# Patient Record
Sex: FEMALE | Race: WHITE | Employment: OTHER
[De-identification: names, ages, dates, MRNs, and addresses within clinical notes are randomized per-mention and may not be internally consistent; named-entity substitution may affect disease eponyms.]

---

## 2017-03-06 DIAGNOSIS — Z86.19 HISTORY OF SHINGLES: Primary | ICD-10-CM

## 2017-03-06 RX ORDER — VALACYCLOVIR HYDROCHLORIDE 500 MG/1
500 TABLET, FILM COATED ORAL 2 TIMES DAILY
Qty: 30 TABLET | Refills: 0 | Status: SHIPPED | OUTPATIENT
Start: 2017-03-06 | End: 2019-08-29

## 2017-03-06 NOTE — TELEPHONE ENCOUNTER
To MD remington hernandez, first time filling this med for her, new pt to MD.   See initial visit note of last year.  This is what pharmacy had been filling for pt. From previous MD.

## 2017-12-31 ENCOUNTER — HEALTH MAINTENANCE LETTER (OUTPATIENT)
Age: 52
End: 2017-12-31

## 2018-11-13 ENCOUNTER — HEALTH MAINTENANCE LETTER (OUTPATIENT)
Age: 53
End: 2018-11-13

## 2019-07-12 LAB — MAMMOGRAM: NORMAL

## 2019-08-29 ENCOUNTER — NURSE TRIAGE (OUTPATIENT)
Dept: NURSING | Facility: CLINIC | Age: 54
End: 2019-08-29

## 2019-08-29 ENCOUNTER — OFFICE VISIT (OUTPATIENT)
Dept: FAMILY MEDICINE | Facility: CLINIC | Age: 54
End: 2019-08-29
Payer: COMMERCIAL

## 2019-08-29 VITALS
SYSTOLIC BLOOD PRESSURE: 94 MMHG | DIASTOLIC BLOOD PRESSURE: 70 MMHG | TEMPERATURE: 98.5 F | OXYGEN SATURATION: 100 % | BODY MASS INDEX: 18.59 KG/M2 | RESPIRATION RATE: 16 BRPM | WEIGHT: 110 LBS | HEART RATE: 72 BPM

## 2019-08-29 DIAGNOSIS — R19.7 ACUTE DIARRHEA: Primary | ICD-10-CM

## 2019-08-29 NOTE — NURSING NOTE
54 year old  Chief Complaint   Patient presents with     Diarrhea     x 4 days     Rectal Problem     x 1 day     Fever     100 - 101 seems to be ok now       Blood pressure 94/70, pulse 72, temperature 98.5  F (36.9  C), temperature source Oral, resp. rate 16, weight 49.9 kg (110 lb), SpO2 100 %. Body mass index is 18.59 kg/m .  Patient Active Problem List   Diagnosis     Preventative health care     History of shingles       Wt Readings from Last 2 Encounters:   08/29/19 49.9 kg (110 lb)   02/11/16 47.6 kg (105 lb)     BP Readings from Last 3 Encounters:   08/29/19 94/70   02/11/16 90/59         Current Outpatient Medications   Medication     valACYclovir (VALTREX) 500 MG tablet     No current facility-administered medications for this visit.        Social History     Tobacco Use     Smoking status: Never Smoker     Smokeless tobacco: Never Used   Substance Use Topics     Alcohol use: Yes     Comment: GLASS OF WINE     Drug use: No       Health Maintenance Due   Topic Date Due     COLONOSCOPY  02/02/1977     HIV SCREENING  02/02/1982     DTAP/TDAP/TD IMMUNIZATION (1 - Tdap) 02/02/1992     LIPID  02/02/2012     MAMMO SCREENING  06/01/2016     ZOSTER IMMUNIZATION (1 of 2) 02/02/2017     PREVENTIVE CARE VISIT  02/11/2017     PAP  09/01/2018     PHQ-2  01/01/2019       No results found for: PAP      August 29, 2019 1:22 PM

## 2019-08-29 NOTE — TELEPHONE ENCOUNTER
Diarrhea with blood in it.  I connected with the clinic who is going to check with their RN and may send her to the ER anyway. I told them, before transferring, that the patient won't listen to me.  Nyla Garza RN-[facility]      Reason for Disposition    Bloody, black, or tarry bowel movements    Additional Information    Negative: Shock suspected (e.g., cold/pale/clammy skin, too weak to stand, low BP, rapid pulse)    Negative: Difficult to awaken or acting confused (e.g., disoriented, slurred speech)    Negative: Sounds like a life-threatening emergency to the triager    Negative: Vomiting also present and worse than the diarrhea    Negative: Blood in stool and without diarrhea    Negative: SEVERE abdominal pain (e.g., excruciating) and present > 1 hour     Pain at 3    Negative: SEVERE abdominal pain and age > 60    Protocols used: DIARRHEA-A-OH

## 2019-08-29 NOTE — PROGRESS NOTES
"  SUBJECTIVE:   Kendra Loyd is a 54 year old female who presents to clinic today to establish care and to discuss the following problem(s).    Of note, she has been seen here once before, >3 years ago.    # Diarrhea  - Flew home from [place] Sunday night (5 days ago)  - the next morning she and her  woke up feeling achy  - by the end of the day both had fevers  - was up all night achy everywhere, abdominal pain  - dry heaving and diarrhea started  - has continued to have fevers each day until last night  - still having frequent diarrhea, watery loose stools every hour  - poor appetite since Monday, staying hydrated but not eating solids  - started having blood in stools this morning, two out of every three times she has a BM, \"mucousy blood\", not a lot in the actual toilet bowel  - no perianal pain and no history of hemorrhoids  - has a black toilet but doesn't think she has seen any dark black stools (hasn't seen any on toilet paper)  - cramping eases off with defecation  - no particular dizziness/lightheadedness  - kids did not get sick      ROS: Denies chest pain, difficulty breathing    History reviewed. No pertinent past medical history.  Past Surgical History:   Procedure Laterality Date     ATOPIC PREG  01/01/2002     BIOPSY      NOSE     HC EXCISION SKENES GLANDS N/A 01/01/1995     HCL SQUAMOUS CELL CARCINOMA AG N/A 01/01/2004     Family History   Problem Relation Age of Onset     Coronary Artery Disease Father         Controlled     Other Cancer Father         Melanoma     Diabetes No family hx of      Hypertension No family hx of      Hyperlipidemia No family hx of      Cerebrovascular Disease No family hx of      Breast Cancer No family hx of      Colon Cancer No family hx of      Prostate Cancer No family hx of      Anxiety Disorder No family hx of      Mental Illness No family hx of      Substance Abuse No family hx of      Anesthesia Reaction No family hx of      Asthma No family hx of  "     Social History     Tobacco Use     Smoking status: Never Smoker     Smokeless tobacco: Never Used   Substance Use Topics     Alcohol use: Yes     Comment: GLASS OF WINE     Drug use: No     Social History     Social History Narrative     Not on file       No current outpatient medications on file.     No current facility-administered medications for this visit.      I have reviewed the patient's past medical, surgical, family, and social history.     OBJECTIVE:   BP 94/70 (BP Location: Left arm, Patient Position: Sitting, Cuff Size: Adult Regular)   Pulse 72   Temp 98.5  F (36.9  C) (Oral)   Resp 16   Wt 49.9 kg (110 lb)   SpO2 100%   BMI 18.59 kg/m       BP Readings from Last 6 Encounters:   08/29/19 94/70   02/11/16 90/59     Constitutional: fatigued-appearing but not in acute distress, appears stated age  Eyes: conjunctivae without erythema, sclera anicteric.   Abdomen: normal bowel sounds. Soft, non-tender.  Skin: no rashes, lesions, or wounds  Psych: affect is full and appropriate, speech is fluent and non-pressured    ASSESSMENT AND PLAN:     (R19.7) Acute diarrhea  (primary encounter diagnosis)  Comment: Likely bacterial gastroenteritis given fevers, mucoid/bloody diarrhea, lack of appetite. Given general well appearance and the fact that she is no longer febrile, testing or empiric treatment is not strictly necessary but persistent diarrhea with blood/mucous would push me to favor at least testing. Sending home with stool collection kit. If no improvement by tomorrow morning will bring in sample. Discussed and offered empiric antibiotics but will hold off for now pending testing results.   Plan: Enteric Bacteria and Virus Panel by [facility] Stool,         Clostridium difficile toxin B    Encouraged her to return once well for routine preventive care.         Xavi Oro MD   [facility]  08/29/2019, 1:42 PM

## 2019-08-30 ENCOUNTER — TELEPHONE (OUTPATIENT)
Dept: FAMILY MEDICINE | Facility: CLINIC | Age: 54
End: 2019-08-30

## 2019-08-30 NOTE — TELEPHONE ENCOUNTER
[facility] Call Center    Phone Message    May a detailed message be left on voicemail: yes    Reason for Call: Symptoms or Concerns       Current symptom or concern: Kendra was seen in clinic yesterday by Dr. Oro.  She believes her symptoms may be related to the EEE Virus and would like to be tested.  She also indicates that her , also a [facility] patient, is presenting with similar symptoms.  Her spouse has not recently been seen.    Symptoms have been present for: 4 days(s)    Has patient previously been seen for this? Yes    By : Dr. Oro    Date: 8.29.19    Are there any new or worsening symptoms? No      Action Taken: Message routed to:  [facility] nurse

## 2019-08-30 NOTE — TELEPHONE ENCOUNTER
Called Kendra.    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  The outbreaks, though, involve typically <10 cases over 2-3 years at a time and there have been fewer than 100 cases in [place] since 1938.  I would also expect Yusuf to be more ill if she had EEE.  Furthermore, EEE is typically managed with supportive care only and given that her symptoms seem to be improving today (no longer febrile, diarrhea improved), I would not expect to manage her differently even if she tested positive for EEE (though of course it would be a reportable illness).     Discussed bringing in a stool sample as planned at the visit yesterday, at this point mostly for the reassurance that a definitive answer would provide. Because she is improving, Yusuf declined.    I have asked her to call back with any new or concerning symptoms or if she does not continue to improve.     Telly is schedule to see me at 2pm today with similar symptoms.    Xavi Oro MD on 8/30/2019 at 12:57 PM

## 2019-09-20 ENCOUNTER — DOCUMENTATION ONLY (OUTPATIENT)
Dept: CARE COORDINATION | Facility: CLINIC | Age: 54
End: 2019-09-20

## 2019-09-20 NOTE — TELEPHONE ENCOUNTER
RECORDS RECEIVED FROM: Lt knee injury due to fall, per pt. DOI 08/2019. No records available   DATE RECEIVED: 9/20   NOTES STATUS DETAILS   OFFICE NOTE from referring provider N/A    OFFICE NOTE from other specialist N/A    DISCHARGE SUMMARY from hospital N/A    DISCHARGE REPORT from the ER N/A    OPERATIVE REPORT N/A    MEDICATION LIST N/A    MRI N/A    CT SCAN N/A    XRAYS (IMAGES & REPORTS) N/A

## 2019-09-23 ENCOUNTER — ANCILLARY PROCEDURE (OUTPATIENT)
Dept: GENERAL RADIOLOGY | Facility: CLINIC | Age: 54
End: 2019-09-23
Payer: COMMERCIAL

## 2019-09-23 ENCOUNTER — OFFICE VISIT (OUTPATIENT)
Dept: ORTHOPEDICS | Facility: CLINIC | Age: 54
End: 2019-09-23
Payer: COMMERCIAL

## 2019-09-23 ENCOUNTER — PRE VISIT (OUTPATIENT)
Dept: ORTHOPEDICS | Facility: CLINIC | Age: 54
End: 2019-09-23

## 2019-09-23 VITALS — HEIGHT: 64 IN | WEIGHT: 110 LBS | BODY MASS INDEX: 18.78 KG/M2

## 2019-09-23 DIAGNOSIS — M25.562 LEFT KNEE PAIN, UNSPECIFIED CHRONICITY: Primary | ICD-10-CM

## 2019-09-23 DIAGNOSIS — M22.2X2 PATELLOFEMORAL PAIN SYNDROME OF LEFT KNEE: Primary | ICD-10-CM

## 2019-09-23 DIAGNOSIS — M25.562 LEFT KNEE PAIN, UNSPECIFIED CHRONICITY: ICD-10-CM

## 2019-09-23 ASSESSMENT — PAIN SCALES - GENERAL: PAINLEVEL: NO PAIN (1)

## 2019-09-23 ASSESSMENT — MIFFLIN-ST. JEOR: SCORE: 1090.47

## 2019-09-23 NOTE — LETTER
"  9/23/2019      RE: Kendra Loyd  [address] [address]       Sports Medicine Clinic Visit    PCP: Kam Malone    Kendra Loyd is a 54 year old female who is seen  as self referral presenting with left knee pain.    Injury: 8/18/2019- fell and left knee landed directly onto a rock while hiking    Location of Pain: left anterior knee  Duration of Pain: 1 month(s)  Rating of Pain: 1/10 dull and achy at rest; 5/10 with exercise  Pain is better with: Rest  Pain is worse with: Jogging, running, exercising, walking (initially)  Additional Features: Bruising  Treatment so far consists of: Rest  Prior History of related problems: None    Ht 1.62 m (5' 3.78\")   Wt 49.9 kg (110 lb)   BMI 19.01 kg/m            PMH:  Cyst removed in her mid 20s; squamous cell carcinoma removed with skin graft on the right side of her nose; one ectopic pregnancy; recurrent zoster    Active problem list:  Patient Active Problem List   Diagnosis     Preventative health care     History of shingles       FH:  Family History   Problem Relation Age of Onset     Coronary Artery Disease Father         Controlled     Other Cancer Father         Melanoma     Diabetes No family hx of      Hypertension No family hx of      Hyperlipidemia No family hx of      Cerebrovascular Disease No family hx of      Breast Cancer No family hx of      Colon Cancer No family hx of      Prostate Cancer No family hx of      Anxiety Disorder No family hx of      Mental Illness No family hx of      Substance Abuse No family hx of      Anesthesia Reaction No family hx of      Asthma No family hx of        SH:  Social History     Socioeconomic History     Marital status:      Spouse name: Not on file     Number of children: Not on file     Years of education: Not on file     Highest education level: Not on file   Occupational History     Not on file   Social Needs     Financial resource strain: Not on file     Food insecurity:     Worry: " Not on file     Inability: Not on file     Transportation needs:     Medical: Not on file     Non-medical: Not on file   Tobacco Use     Smoking status: Never Smoker     Smokeless tobacco: Never Used   Substance and Sexual Activity     Alcohol use: Yes     Comment: GLASS OF WINE     Drug use: No     Sexual activity: Yes     Partners: Male   Lifestyle     Physical activity:     Days per week: Not on file     Minutes per session: Not on file     Stress: Not on file   Relationships     Social connections:     Talks on phone: Not on file     Gets together: Not on file     Attends Islam service: Not on file     Active member of club or organization: Not on file     Attends meetings of clubs or organizations: Not on file     Relationship status: Not on file     Intimate partner violence:     Fear of current or ex partner: Not on file     Emotionally abused: Not on file     Physically abused: Not on file     Forced sexual activity: Not on file   Other Topics Concern     Parent/sibling w/ CABG, MI or angioplasty before 65F 55M? Not Asked   Social History Narrative     Not on file       MEDS:  See EMR, reviewed  ALL:  See EMR, reviewed    REVIEW OF SYSTEMS:  CONSTITUTIONAL:NEGATIVE for fever, chills, change in weight  INTEGUMENTARY/SKIN: NEGATIVE for worrisome rashes, moles or lesions  EYES: NEGATIVE for vision changes or irritation  ENT/MOUTH: NEGATIVE for ear, mouth and throat problems  RESP:NEGATIVE for significant cough or SOB  BREAST: NEGATIVE for masses, tenderness or discharge  CV: NEGATIVE for chest pain, palpitations or peripheral edema  GI: NEGATIVE for nausea, abdominal pain, heartburn, or change in bowel habits  :NEGATIVE for frequency, dysuria, or hematuria  :NEGATIVE for frequency, dysuria, or hematuria  NEURO: NEGATIVE for weakness, dizziness or paresthesias  ENDOCRINE: NEGATIVE for temperature intolerance, skin/hair changes  HEME/ALLERGY/IMMUNE: NEGATIVE for bleeding problems  PSYCHIATRIC: NEGATIVE  for changes in mood or affect        Subjective: This 54-year-old female landed directly onto her anterior knee 4 weeks ago when she lost her balance hiking.  It was bruised initially.  It does not bother her a month later with any of the usual daily activities but certain positions of kneeling on the knee can be uncomfortable and she can notice some pain at the anterior knee after exercise.  Otherwise it does not seem to bother her with any specific movement.  She denies a sense of instability.  It does not swell or lock.    Objective: There is a nearly resolved bruise along the kneecap.  There is no effusion.  She can do an active straight leg raise with no extension lag.  She is nontender over the medial lateral patellar facet.  Nontender over the patellar tendon or pes anserine bursa.  Patellar compression is without discomfort.  Patellar apprehension signs are negative.  Nontender over the medial lateral joint line.  I can flex and extend the knee fully.  Lachman's has a firm endpoint.  Anterior posterior drawer is negative.  MCL and LCL stresses are without discomfort or laxity.  Normal range of motion at the hip.  Distal pulses and sensation are intact.  Appropriate in conversation and affect.    An x-ray of the knee shows no bony abnormality or degenerative change    Assessment left-sided traumatic patellofemoral discomfort    Plan: She would like to see physical therapy.  It would be reasonable to try some patellar taping and alignment program since is been 4 weeks since her initial injury.  She will follow-up if not improved.    This note was created with the use of Dragon software and unintentional spelling or errors may have occurred.        Hernesto Dodson MD

## 2019-09-23 NOTE — PROGRESS NOTES
"Sports Medicine Clinic Visit    PCP: Kam Malone    Kendra Loyd is a 54 year old female who is seen  as self referral presenting with left knee pain.    Injury: 8/18/2019- fell and left knee landed directly onto a rock while hiking    Location of Pain: left anterior knee  Duration of Pain: 1 month(s)  Rating of Pain: 1/10 dull and achy at rest; 5/10 with exercise  Pain is better with: Rest  Pain is worse with: Jogging, running, exercising, walking (initially)  Additional Features: Bruising  Treatment so far consists of: Rest  Prior History of related problems: None    Ht 1.62 m (5' 3.78\")   Wt 49.9 kg (110 lb)   BMI 19.01 kg/m           PMH:  Cyst removed in her mid 20s; squamous cell carcinoma removed with skin graft on the right side of her nose; one ectopic pregnancy; recurrent zoster    Active problem list:  Patient Active Problem List   Diagnosis     Preventative health care     History of shingles       FH:  Family History   Problem Relation Age of Onset     Coronary Artery Disease Father         Controlled     Other Cancer Father         Melanoma     Diabetes No family hx of      Hypertension No family hx of      Hyperlipidemia No family hx of      Cerebrovascular Disease No family hx of      Breast Cancer No family hx of      Colon Cancer No family hx of      Prostate Cancer No family hx of      Anxiety Disorder No family hx of      Mental Illness No family hx of      Substance Abuse No family hx of      Anesthesia Reaction No family hx of      Asthma No family hx of        SH:  Social History     Socioeconomic History     Marital status:      Spouse name: Not on file     Number of children: Not on file     Years of education: Not on file     Highest education level: Not on file   Occupational History     Not on file   Social Needs     Financial resource strain: Not on file     Food insecurity:     Worry: Not on file     Inability: Not on file     Transportation needs:     Medical: Not " on file     Non-medical: Not on file   Tobacco Use     Smoking status: Never Smoker     Smokeless tobacco: Never Used   Substance and Sexual Activity     Alcohol use: Yes     Comment: GLASS OF WINE     Drug use: No     Sexual activity: Yes     Partners: Male   Lifestyle     Physical activity:     Days per week: Not on file     Minutes per session: Not on file     Stress: Not on file   Relationships     Social connections:     Talks on phone: Not on file     Gets together: Not on file     Attends Roman Catholic service: Not on file     Active member of club or organization: Not on file     Attends meetings of clubs or organizations: Not on file     Relationship status: Not on file     Intimate partner violence:     Fear of current or ex partner: Not on file     Emotionally abused: Not on file     Physically abused: Not on file     Forced sexual activity: Not on file   Other Topics Concern     Parent/sibling w/ CABG, MI or angioplasty before 65F 55M? Not Asked   Social History Narrative     Not on file       MEDS:  See EMR, reviewed  ALL:  See EMR, reviewed    REVIEW OF SYSTEMS:  CONSTITUTIONAL:NEGATIVE for fever, chills, change in weight  INTEGUMENTARY/SKIN: NEGATIVE for worrisome rashes, moles or lesions  EYES: NEGATIVE for vision changes or irritation  ENT/MOUTH: NEGATIVE for ear, mouth and throat problems  RESP:NEGATIVE for significant cough or SOB  BREAST: NEGATIVE for masses, tenderness or discharge  CV: NEGATIVE for chest pain, palpitations or peripheral edema  GI: NEGATIVE for nausea, abdominal pain, heartburn, or change in bowel habits  :NEGATIVE for frequency, dysuria, or hematuria  :NEGATIVE for frequency, dysuria, or hematuria  NEURO: NEGATIVE for weakness, dizziness or paresthesias  ENDOCRINE: NEGATIVE for temperature intolerance, skin/hair changes  HEME/ALLERGY/IMMUNE: NEGATIVE for bleeding problems  PSYCHIATRIC: NEGATIVE for changes in mood or affect        Subjective: This 54-year-old female landed  directly onto her anterior knee 4 weeks ago when she lost her balance hiking.  It was bruised initially.  It does not bother her a month later with any of the usual daily activities but certain positions of kneeling on the knee can be uncomfortable and she can notice some pain at the anterior knee after exercise.  Otherwise it does not seem to bother her with any specific movement.  She denies a sense of instability.  It does not swell or lock.    Objective: There is a nearly resolved bruise along the kneecap.  There is no effusion.  She can do an active straight leg raise with no extension lag.  She is nontender over the medial lateral patellar facet.  Nontender over the patellar tendon or pes anserine bursa.  Patellar compression is without discomfort.  Patellar apprehension signs are negative.  Nontender over the medial lateral joint line.  I can flex and extend the knee fully.  Lachman's has a firm endpoint.  Anterior posterior drawer is negative.  MCL and LCL stresses are without discomfort or laxity.  Normal range of motion at the hip.  Distal pulses and sensation are intact.  Appropriate in conversation and affect.    An x-ray of the knee shows no bony abnormality or degenerative change    Assessment left-sided traumatic patellofemoral discomfort    Plan: She would like to see physical therapy.  It would be reasonable to try some patellar taping and alignment program since is been 4 weeks since her initial injury.  She will follow-up if not improved.    This note was created with the use of Dragon software and unintentional spelling or errors may have occurred.

## 2019-09-30 LAB
HPV ABSTRACT: NORMAL
PAP-ABSTRACT: NORMAL

## 2019-10-10 ENCOUNTER — TELEPHONE (OUTPATIENT)
Dept: FAMILY MEDICINE | Facility: CLINIC | Age: 54
End: 2019-10-10

## 2019-10-10 DIAGNOSIS — Z12.11 SPECIAL SCREENING FOR MALIGNANT NEOPLASMS, COLON: Primary | ICD-10-CM

## 2019-10-10 NOTE — TELEPHONE ENCOUNTER
[facility] Call Center    Phone Message    May a detailed message be left on voicemail: yes    Reason for Call: Other: Patient calling requesting an order be placed for her to have a routine colooscopy, jennifer call to confirm thank you.      Action Taken: Message routed to:  [facility] [facility]: [place]

## 2019-12-09 ENCOUNTER — TELEPHONE (OUTPATIENT)
Dept: GASTROENTEROLOGY | Facility: OUTPATIENT CENTER | Age: 54
End: 2019-12-09

## 2019-12-09 NOTE — TELEPHONE ENCOUNTER
Patient taking any blood thinners ? No     Heart disease ? Denies     Lung disease ?Denies       Sleep apnea ?Denies     Diabetic ?Denies     Kidney disease ?Denies     Electronic implanted medical devices ?Denies     Are you taking any narcotic pain medication ? No       PTSD ? N/a     Prep instructions reviewed with patient ? Instructions, policy,MAC sedation plan reviewed. Advised patient to have someone stay with them post exam.    Pharmacy : N/a    Indication for procedure : Screening colonoscopy    Referring provider :Kam Malone MD     Arrival Time : 1 PM     :

## 2019-12-16 ENCOUNTER — DOCUMENTATION ONLY (OUTPATIENT)
Dept: GASTROENTEROLOGY | Facility: OUTPATIENT CENTER | Age: 54
End: 2019-12-16
Payer: COMMERCIAL

## 2019-12-16 ENCOUNTER — TRANSFERRED RECORDS (OUTPATIENT)
Dept: HEALTH INFORMATION MANAGEMENT | Facility: CLINIC | Age: 54
End: 2019-12-16

## 2020-01-13 ENCOUNTER — OFFICE VISIT (OUTPATIENT)
Dept: FAMILY MEDICINE | Facility: CLINIC | Age: 55
End: 2020-01-13
Payer: COMMERCIAL

## 2020-01-13 VITALS
HEART RATE: 56 BPM | SYSTOLIC BLOOD PRESSURE: 97 MMHG | OXYGEN SATURATION: 100 % | HEIGHT: 64 IN | DIASTOLIC BLOOD PRESSURE: 63 MMHG | BODY MASS INDEX: 18.52 KG/M2 | RESPIRATION RATE: 16 BRPM | WEIGHT: 108.5 LBS | TEMPERATURE: 97.9 F

## 2020-01-13 DIAGNOSIS — Z00.00 PREVENTATIVE HEALTH CARE: Primary | ICD-10-CM

## 2020-01-13 SDOH — HEALTH STABILITY: MENTAL HEALTH: HOW OFTEN DO YOU HAVE A DRINK CONTAINING ALCOHOL?: 4 OR MORE TIMES A WEEK

## 2020-01-13 ASSESSMENT — MIFFLIN-ST. JEOR: SCORE: 1087.15

## 2020-01-13 NOTE — PROGRESS NOTES
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 Overall she is doing well. She has two children. Her daughter is a freshman at [employer] at bedtime and her son is a sophomore at [employer]. Her son has been battling with mental health issues which has caused some stress.     She would like to discuss the Shingrix vaccine today. She has had recurrent episodes of a shingles like phenomena in the past. She literally could not walk at one point and was hospitalized for several days. She is concerned that she will have another recurrent episode.     FAMILY, SOCIAL AND PAST SURGICAL HISTORIES: History of campylobacter infection this past summer that has now resolved.       MEDICATIONS:  Carefully reviewed.       HEALTHCARE MAINTENANCE: She does not wear glasses or contacts. She has no problems reading her music when playing, but she has noticed her vision has worsened. He has some mild concerns regarding her hearing. Finger rub heard from 2 ft away bilaterally. Dental care is up-to-date. Her blood pressure is 97/63 today. Body mass index is 18.62 kg/m . From an immunization standpoint, she is up to date. Mammogram and Pap is up to date. She had a colonoscopy on 12/16/19 that showed some diverticulosis, otherwise it was completely normal. Necessary labs were already completed and were normal.       REVIEW OF SYSTEMS:  A 10-point review is negative.       OBJECTIVE:    GENERAL: Yusuf is in no distress.  Affect is upbeat.     VITAL SIGNS: BP 97/63 (BP Location: Right arm, Patient Position: Sitting, Cuff Size: Adult Regular)   Pulse 56   Temp 97.9  F (36.6  C) (Oral)   Resp 16   Ht 1.626 m (5' 4\")   Wt 49.2 kg (108 lb 8 oz)   SpO2 100%   BMI 18.62 kg/m    HEENT:  Head is normocephalic, atraumatic. Ears clear bilaterally. No pain with palpation of the frontal or maxillary " sinuses.  Eyes are grossly normal.  Nose is free of any congestion or discharge.  Teeth in good repair.  Mucous membranes are moist.  Throat looks benign.  Neck is supple without adenopathy or thyromegaly.  No carotid bruits are heard, no JVD.   BACK:  Smooth and straight.  No pain to percussion.  No CVA tenderness.   LUNGS:  Clear to auscultation bilaterally.   HEART:  Regular rate and rhythm without murmur.     EXTREMITIES:  Ankles free of any edema.   SKIN:  Warm and dry.       LABORATORY DATA: none today      ASSESSMENT AND PLAN:   1. Adult physical exam, up to date on healthcare maintenance strategies.   2. Recommended getting an eye exam.  3. Discussed recommendation for shingles vaccine when available at pharmacy.  4. Return to clinic in 1 year for annual physical    Call with any problems or questions.     I, Sean Sharp, am serving as a scribe to document services personally performed by Dr. Kam Malone, based on data collection and the provider's statements to me. Dr. Malone has reviewed, edited, and approved the above note.     --Kam Malone MD

## 2020-01-13 NOTE — NURSING NOTE
"54 year old  Chief Complaint   Patient presents with     Physical     Imm/Inj     talk about whether to get Shingrix        Blood pressure 97/63, pulse 56, temperature 97.9  F (36.6  C), temperature source Oral, resp. rate 16, height 1.626 m (5' 4\"), weight 49.2 kg (108 lb 8 oz), SpO2 100 %. Body mass index is 18.62 kg/m .  Patient Active Problem List   Diagnosis     Preventative health care     History of shingles       Wt Readings from Last 2 Encounters:   01/13/20 49.2 kg (108 lb 8 oz)   09/23/19 49.9 kg (110 lb)     BP Readings from Last 3 Encounters:   01/13/20 97/63   08/29/19 94/70   02/11/16 90/59         No current outpatient medications on file.     No current facility-administered medications for this visit.        Social History     Tobacco Use     Smoking status: Never Smoker     Smokeless tobacco: Never Used   Substance Use Topics     Alcohol use: Yes     Frequency: 4 or more times a week     Comment: GLASS OF WINE     Drug use: No       Health Maintenance Due   Topic Date Due     HIV SCREENING  02/12/1982     HPV  02/12/1988     LIPID  02/12/2012     MAMMO SCREENING  06/01/2016     PREVENTIVE CARE VISIT  02/11/2017     ZOSTER IMMUNIZATION (1 of 2) 02/12/2017     INFLUENZA VACCINE (1) 09/01/2019     PHQ-2  01/01/2020       No results found for: PAP      January 13, 2020 8:59 AM    "

## 2020-07-29 ENCOUNTER — TELEPHONE (OUTPATIENT)
Dept: FAMILY MEDICINE | Facility: CLINIC | Age: 55
End: 2020-07-29

## 2020-07-29 DIAGNOSIS — Z20.822 EXPOSURE TO COVID-19 VIRUS: Primary | ICD-10-CM

## 2020-07-29 NOTE — TELEPHONE ENCOUNTER
Traveling to [place], arriving 8/16/20 7:30pm, and needs COVID-PCR test administered 72 hours prior to arrival and negative result to gain entry to [place].    She will call her children's PCP for test orders for them, they are not under [facility] care.     Sheila Lewis RN  07/29/20  12:05 PM

## 2020-07-29 NOTE — TELEPHONE ENCOUNTER
[facility] Call [facility]    Phone Message    May a detailed message be left on voicemail: yes     Reason for Call: Other: Patient called stating she is traveling to [place] in August to visit family. Patient stated that [place] requires any [place] resident to have a COVID test 72 hours prior to coming to the Critical access hospital. Patient wondering if Dr. Malone can order the test for her or where she can go to be tested. Patient also stated that she is needing to have her children tested (children are not patients of the clinic per pt). Per Ailyn at the , sending message to the clinic for review. Please call patient back.     Action Taken: Message routed to:  [facility]: [facility]    Travel Screening: Not Applicable

## 2020-08-12 ENCOUNTER — NURSE TRIAGE (OUTPATIENT)
Dept: NURSING | Facility: CLINIC | Age: 55
End: 2020-08-12

## 2020-08-12 DIAGNOSIS — U07.1 2019 NOVEL CORONAVIRUS DISEASE (COVID-19): Primary | ICD-10-CM

## 2020-08-12 NOTE — TELEPHONE ENCOUNTER
"Yusuf is calling to schedule an antibody test.    Patient is calling requesting COVID serologic antibody testing.  NOTE: Serologic testing is a blood test for 'antibodies' which are made at 10-14 days after you have had symptoms of COVID or were exposed and had an asymptomatic infection.  This does NOT test you for 'active' infection or tell you if you are contagious.    Are you a healthcare worker? no  Do you currently have a cough, fever, body aches, shortness of breath, or difficulty breathing?  No  Did you previously have cough, fever, body aches, shortness of breath, or difficulty breathing that have now resolved? Has had previous covid symptoms.   Symptoms began 150 days ago.  Symptoms started > 14 days ago. Lab order placed per SARS-CoV-2 Serology test Standing Order using indication \"Previously symptomatic >14d since onset, currently asymptomatic\" and diagnosis code \"Screening for viral disease\" (Z11.59)      The patient was informed: \"Testing is limited each day and it may take time for testing to be available to everyone who has called. You will receive a call within 48-72 hours to schedule the serology testing. Please confirm the best number to reach you is [phone]. If you have any questions about scheduling, call [phone].\"             "

## 2020-08-13 DIAGNOSIS — Z20.822 EXPOSURE TO COVID-19 VIRUS: ICD-10-CM

## 2020-08-13 DIAGNOSIS — U07.1 2019 NOVEL CORONAVIRUS DISEASE (COVID-19): ICD-10-CM

## 2020-08-13 LAB
SARS-COV-2 RNA SPEC QL NAA+PROBE: NOT DETECTED
SPECIMEN SOURCE: NORMAL

## 2020-08-13 PROCEDURE — 36415 COLL VENOUS BLD VENIPUNCTURE: CPT | Performed by: EMERGENCY MEDICINE

## 2020-08-13 PROCEDURE — 86769 SARS-COV-2 COVID-19 ANTIBODY: CPT | Performed by: EMERGENCY MEDICINE

## 2020-08-15 LAB
COVID-19 SPIKE RBD ABY TITER: NORMAL
COVID-19 SPIKE RBD ABY: NEGATIVE

## 2020-08-25 ENCOUNTER — VIRTUAL VISIT (OUTPATIENT)
Dept: FAMILY MEDICINE | Facility: OTHER | Age: 55
End: 2020-08-25
Payer: COMMERCIAL

## 2020-08-25 PROCEDURE — 99421 OL DIG E/M SVC 5-10 MIN: CPT | Performed by: PHYSICIAN ASSISTANT

## 2020-08-25 NOTE — PROGRESS NOTES
"Date: 2020 16:23:37  Clinician: Marcus Fuentes  Clinician NPI: [id number]  Patient: Kendra Loyd  Patient : 1965  Patient Address: [address], [place] [address]  Patient Phone: [phone]  Visit Protocol: URI  Patient Summary:  Kendra is a 55 year old ( : 1965 ) female who initiated a Visit for COVID-19 (Coronavirus) evaluation and screening. When asked the question \"Please sign me up to receive news, health information and promotions from [employer].\", Kendra responded \"No\".    When asked when her symptoms started, Kendra reported that she does not have any symptoms.   She denies having recent facial or sinus surgery in the past 60 days and taking antibiotic medication in the past month.    Pertinent COVID-19 (Coronavirus) information  In the past 14 days, Kendra has not worked in a congregate living setting.   She does not work or volunteer as healthcare worker or a  and does not work or volunteer in a healthcare facility.   Kendra also has not lived in a congregate living setting in the past 14 days. She does not live with a healthcare worker.   Kendra has had a close contact with a laboratory-confirmed COVID-19 patient in the last 14 days. Additional information about contact with COVID-19 (Coronavirus) patient as reported by the patient (free text): My son was recently confirmed COVID positive, but asymptomatic.    Patient reported they are living in the same household with a COVID-19 positive patient.  Since 2019, Kendra and has not had upper respiratory infection or influenza-like illness. Has not been diagnosed with lab-confirmed COVID-19 test   Pertinent medical history  Kendra does not get yeast infections when she takes antibiotics.   Kendra needs a return to work/school note.   Weight: 108 lbs   Kendra does not smoke or use smokeless tobacco.   Weight: 108 lbs    MEDICATIONS: No current medications, " "ALLERGIES: NKDA  Clinician Response:  Dear Kendra,   Your symptoms show that you may have coronavirus (COVID-19). This illness can cause fever, cough and trouble breathing. Many people get a mild case and get better on their own. Some people can get very sick.  What should I do?  We would like to test you for this virus.   1. Please call [phone] to schedule your visit. Explain that you were referred by [facility] to have a COVID-19 test. Be ready to share your [facility] visit ID number.  The following will serve as your written order for this COVID Test, ordered by me, for the indication of suspected COVID [Z20.828]: The test will be ordered in [employer], our electronic health record, after you are scheduled. It will show as ordered and authorized by Ron Pearce MD.  Order: COVID-19 (Coronavirus) PCR for SYMPTOMATIC testing from [facility].      2. When it's time for your COVID test:  Stay at least 6 feet away from others. (If someone will drive you to your test, stay in the backseat, as far away from the  as you can.)   Cover your mouth and nose with a mask, tissue or washcloth.  Go straight to the testing site. Don't make any stops on the way there or back.      3.Starting now: Stay home and away from others (self-isolate) until:   You've had no fever---and no medicine that reduces fever---for one full day (24 hours). And...   Your other symptoms have gotten better. For example, your cough or breathing has improved. And...   At least 10 days have passed since your symptoms started.       During this time, don't leave the house except for testing or medical care.   Stay in your own room, even for meals. Use your own bathroom if you can.   Stay away from others in your home. No hugging, kissing or shaking hands. No visitors.  Don't go to work, school or anywhere else.    Clean \"high touch\" surfaces often (doorknobs, counters, handles, etc.). Use a household cleaning spray or wipes. You'll find a full list of  " on the EPA website: www.epa.gov/pesticide-registration/list-n-disinfectants-use-against-sars-cov-2.   Cover your mouth and nose with a mask, tissue or washcloth to avoid spreading germs.  Wash your hands and face often. Use soap and water.  Caregivers in these groups are at risk for severe illness due to COVID-19:  o People 65 years and older  o People who live in a nursing home or long-term care facility  o People with chronic disease (lung, heart, cancer, diabetes, kidney, liver, immunologic)  o People who have a weakened immune system, including those who:   Are in cancer treatment  Take medicine that weakens the immune system, such as corticosteroids  Had a bone marrow or organ transplant  Have an immune deficiency  Have poorly controlled HIV or AIDS  Are obese (body mass index of 40 or higher)  Smoke regularly   o Caregivers should wear gloves while washing dishes, handling laundry and cleaning bedrooms and bathrooms.  o Use caution when washing and drying laundry: Don't shake dirty laundry, and use the warmest water setting that you can.  o For more tips, go to www.cdc.gov/coronavirus/2019-ncov/downloads/10Things.pdf.    4.Sign up for [employer]. We know it's scary to hear that you might have COVID-19. We want to track your symptoms to make sure you're okay over the next 2 weeks. Please look for an email from [employer]---this is a free, online program that we'll use to keep in touch. To sign up, follow the link in the email. Learn more at [email or web address]  How can I take care of myself?   Get lots of rest. Drink extra fluids (unless a doctor has told you not to).   Take Tylenol (acetaminophen) for fever or pain. If you have liver or kidney problems, ask your family doctor if it's okay to take Tylenol.   Adults can take either:    650 mg (two 325 mg pills) every 4 to 6 hours, or...   1,000 mg (two 500 mg pills) every 8 hours as needed.    Note: Don't take more than 3,000 mg in one day.  Acetaminophen is found in many medicines (both prescribed and over-the-counter medicines). Read all labels to be sure you don't take too much.   For children, check the Tylenol bottle for the right dose. The dose is based on the child's age or weight.    If you have other health problems (like cancer, heart failure, an organ transplant or severe kidney disease): Call your specialty clinic if you don't feel better in the next 2 days.       Know when to call 911. Emergency warning signs include:    Trouble breathing or shortness of breath Pain or pressure in the chest that doesn't go away Feeling confused like you haven't felt before, or not being able to wake up Bluish-colored lips or face.  Where can I get more information?    [facility] -- About COVID-19: [email or web address]   CDC -- What to Do If You're Sick: www.cdc.gov/coronavirus/2019-ncov/about/steps-when-sick.html   CDC -- Ending Home Isolation: www.cdc.gov/coronavirus/2019-ncov/hcp/disposition-in-home-patients.html   CDC -- Caring for Someone: www.cdc.gov/coronavirus/2019-ncov/if-you-are-sick/care-for-someone.html   [facility] -- Interim Guidance for Hospital Discharge to Home: www.health.[facility].[place]/diseases/coronavirus/hcp/hospdischarge.pdf   [facility] clinical trials (COVID-19 research studies): clinicalaffairs.[facility]-clinical-trials    Below are the COVID-19 hotlines at the [facility] of Health ([facility]). Interpreters are available.    For health questions: Call 349-388-3097 or 1-847.240.5716 (7 a.m. to 7 p.m.) For questions about schools and childcare: Call 568-470-0235 or 1-572.885.2666 (7 a.m. to 7 p.m.)    Diagnosis: Contact with and (suspected) exposure to other viral communicable diseases  Diagnosis ICD: Z20.828

## 2020-08-27 DIAGNOSIS — Z20.822 SUSPECTED 2019 NOVEL CORONAVIRUS INFECTION: Primary | ICD-10-CM

## 2020-08-28 LAB
SARS-COV-2 RNA SPEC QL NAA+PROBE: NOT DETECTED
SPECIMEN SOURCE: NORMAL

## 2020-12-27 ENCOUNTER — HEALTH MAINTENANCE LETTER (OUTPATIENT)
Age: 55
End: 2020-12-27

## 2021-02-28 ENCOUNTER — HEALTH MAINTENANCE LETTER (OUTPATIENT)
Age: 56
End: 2021-02-28

## 2021-05-10 ENCOUNTER — OFFICE VISIT (OUTPATIENT)
Dept: FAMILY MEDICINE | Facility: CLINIC | Age: 56
End: 2021-05-10
Payer: COMMERCIAL

## 2021-05-10 VITALS
TEMPERATURE: 96.9 F | WEIGHT: 116 LBS | SYSTOLIC BLOOD PRESSURE: 95 MMHG | RESPIRATION RATE: 15 BRPM | HEART RATE: 55 BPM | HEIGHT: 65 IN | BODY MASS INDEX: 19.33 KG/M2 | OXYGEN SATURATION: 98 % | DIASTOLIC BLOOD PRESSURE: 65 MMHG

## 2021-05-10 DIAGNOSIS — M75.02 ADHESIVE CAPSULITIS OF LEFT SHOULDER: Primary | ICD-10-CM

## 2021-05-10 LAB
HBA1C MFR BLD: 5.2 % (ref 4.1–5.7)
TSH SERPL DL<=0.005 MIU/L-ACNC: 1.61 MU/L (ref 0.4–4)

## 2021-05-10 ASSESSMENT — MIFFLIN-ST. JEOR: SCORE: 1126.43

## 2021-05-10 NOTE — PROGRESS NOTES
OVERVIEW: Kendra Loyd is a 56 year old female who presents to [facility] today for Shoulder Pain (left shoulder pain, thinks possible injury 4 months ago)        ASSESSMENT/PLAN:      1. 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 Given that she's with the [employer], will suggest [facility]   - Also, may try icy-hot pain patches for bedtime.   -Let me know if no improvement and need other pain medication or treatment options.       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  She presents today due to 4 months of LEFT shoulder pain. Began after being pulled by a dog leash.   Has pain now with sleep, clasping bra, doing hair.  It is starting to affect her ability to play the cello.    Saw a physician in [place]    Also, had an MRI at [facility] with the following result:  CONCLUSION:    1. MRI appearance of the inferior glenohumeral capsule as may be associated with adhesive capsulitis versus mild capsular sprain injury.    2. There is a moderate-sized segment of poorly defined deep fiber partial tearing involving the subscapularis tendon, just proximal to the tendon insertion.    3. Mild narrowing of the acromiohumeral interval. Minimal subacromial/subdeltoid bursal inflammation.    4. Unremarkable and intact long head biceps tendon.    5. No evidence for glenohumeral chondromalacia. No definite labral tear. There is a small glenohumeral joint effusion.    Review Of Systems:    Has otherwise been in usual state of health, e.g.   Cardiovascular: negative  Respiratory: No shortness of breath, dyspnea on exertion, cough, or hemoptysis  Gastrointestinal: negative  Genitourinary: negative    Problem list per EMR:  Patient Active Problem List   Diagnosis     Preventative health care     History of shingles       No current outpatient medications on file.       No Known  "Allergies     Social:   Originally from [place].  She is a professional cellist and her  is a professional musician and teacher at [facility] [employer] in [place], [place].       OBJECTIVE    Vitals: BP 95/65 (BP Location: Right arm, Patient Position: Sitting, Cuff Size: Adult Regular)   Pulse 55   Temp 96.9  F (36.1  C) (Skin)   Resp 15   Ht 1.65 m (5' 4.96\")   Wt 52.6 kg (116 lb)   SpO2 98%   BMI 19.33 kg/m    BMI= Body mass index is 19.33 kg/m .  Physically fit and well-appearing.  In no distress.  She stands with her left shoulder at a slightly lower height than her right shoulder but she states this is chronic due to her professional stance with the cello.  Her left shoulder range of motion is very limited with abduction to about 10 degrees internal range of motion to the back pocket and external range of motion to about 20 degrees with the arm lying at its side.  Her strength appears intact in these ranges of motion but it is difficult to assess.  There is no redness or warmth around the shoulder.  Her neck has full pain-free range of motion without any radiculopathy.  Normal distal neurovascular exam.    Cardiovascular and lung exams were normal.    SEE TOP OF NOTE FOR ASSESSMENT AND PLAN    --James Mark MD  [facility], Department of Family Medicine and Community Health  "

## 2021-05-10 NOTE — PATIENT INSTRUCTIONS
ASSESSMENT/PLAN:      1. 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 Given that she's with the [employer], will suggest [facility] [facility]   - Also, may try icy-hot pain patches for bedtime.   -Let me know if no improvement and need other pain medication or treatment options.       --James Mark MD

## 2021-05-10 NOTE — NURSING NOTE
"56 year old  Chief Complaint   Patient presents with     Shoulder Pain     left shoulder pain, thinks possible injury 4 months ago       Blood pressure 95/65, pulse 55, temperature 96.9  F (36.1  C), temperature source Skin, resp. rate 15, height 1.65 m (5' 4.96\"), weight 52.6 kg (116 lb), SpO2 98 %. Body mass index is 19.33 kg/m .  Patient Active Problem List   Diagnosis     Preventative health care     History of shingles       Wt Readings from Last 2 Encounters:   05/10/21 52.6 kg (116 lb)   01/13/20 49.2 kg (108 lb 8 oz)     BP Readings from Last 3 Encounters:   05/10/21 95/65   01/13/20 97/63   08/29/19 94/70         No current outpatient medications on file.     No current facility-administered medications for this visit.        Social History     Tobacco Use     Smoking status: Never Smoker     Smokeless tobacco: Never Used   Substance Use Topics     Alcohol use: Yes     Frequency: 4 or more times a week     Comment: GLASS OF WINE     Drug use: No       Health Maintenance Due   Topic Date Due     ADVANCE CARE PLANNING  Never done     HIV SCREENING  Never done     HEPATITIS C SCREENING  Never done     LIPID  Never done     ZOSTER IMMUNIZATION (1 of 2) Never done     MAMMO SCREENING  07/12/2020     PREVENTIVE CARE VISIT  01/13/2021       No results found for: PAP      May 10, 2021 2:41 PM    "

## 2021-10-04 ENCOUNTER — HEALTH MAINTENANCE LETTER (OUTPATIENT)
Age: 56
End: 2021-10-04

## 2022-01-23 ENCOUNTER — HEALTH MAINTENANCE LETTER (OUTPATIENT)
Age: 57
End: 2022-01-23

## 2022-03-20 ENCOUNTER — HEALTH MAINTENANCE LETTER (OUTPATIENT)
Age: 57
End: 2022-03-20

## 2022-06-08 ENCOUNTER — OFFICE VISIT (OUTPATIENT)
Dept: FAMILY MEDICINE | Facility: CLINIC | Age: 57
End: 2022-06-08
Payer: COMMERCIAL

## 2022-06-08 VITALS
DIASTOLIC BLOOD PRESSURE: 55 MMHG | SYSTOLIC BLOOD PRESSURE: 88 MMHG | RESPIRATION RATE: 12 BRPM | TEMPERATURE: 97.7 F | OXYGEN SATURATION: 99 % | HEART RATE: 63 BPM | WEIGHT: 115.25 LBS | BODY MASS INDEX: 19.2 KG/M2

## 2022-06-08 DIAGNOSIS — R21 SKIN RASH: Primary | ICD-10-CM

## 2022-06-08 PROBLEM — Z98.890 HISTORY OF LOOP ELECTRICAL EXCISION PROCEDURE (LEEP): Status: ACTIVE | Noted: 2022-06-08

## 2022-06-08 RX ORDER — MUPIROCIN 20 MG/G
OINTMENT TOPICAL 2 TIMES DAILY
Qty: 30 G | Refills: 0 | Status: SHIPPED | OUTPATIENT
Start: 2022-06-08

## 2022-06-08 NOTE — PROGRESS NOTES
CC: Rash (Around left eye x couple of months will not go away, is itchy)      Assessment:  Rash around the eyes, unclear etiology. Has been present for a few months, improves with polysporin but then recurs.    Plan:   Patient was provided with prescription of topical mupirocin to be applied to affected areas twice per day for one week. Also placed a referral to dermatology for evaluation. Could consider topical hydrocortisone twice per day for one week if mupirocin does not improve symptoms and if she is still waiting to get in with dermatology. Dosing and possible adverse effects of medication discussed. RTC prn if no improvement with above. Call sooner if any new or worsening symptoms. Also recommend an updated eye exam.       Subjective: Kendra Loyd is a 57 year old female who presents for evaluation of a rash around her left eye for a few months. Has been gradually worsening. It is pruritic. It initially started when she felt her eye itch and she scratched it and a couple days after that she noted a rash.  She tried Polysporin for awhile, it would go away when she used the Polysporin but then it would come back.   Doesn't wear contacts or glasses.  No eye redness, no eye drainage, no vision changes.  Is overdue for an eye exam, had a vision screening and was recommended to be seen for glasses.      New medications recently - none (except was given abx drops by a doctor in [place], did not help at all). No changes in soaps, shampoos, detergents, or lotions. No recent contact irritation patient is aware of. No change in diet. No prior history of rash like this. 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        Objective:  BP (!) 88/55 (BP Location: Right arm, Patient Position: Sitting, Cuff Size: Adult Regular)   Pulse 63   Temp 97.7  F (36.5  C) (Skin)   Resp 12   Wt 52.3 kg (115 lb 4 oz)   SpO2 99%   BMI 19.20 kg/m    General: Patient appears healthy, alert,  and in no distress.  Skin: erythematous small vesicles around left eye, below lower lid and on upper lid, no conjunctival erythema or drainage, no rash around the right eye, no other rash on the face.

## 2022-06-08 NOTE — PATIENT INSTRUCTIONS
Dermatology referral.   Mupirocin ointment twice daily for 1 week.   If not improving, consider hydrocortisone cream for 1 week.   Make an updated eye appointment.

## 2022-06-08 NOTE — NURSING NOTE
57 year old  Chief Complaint   Patient presents with     Rash     Around left eye x couple of months will not go away, is itchy       Blood pressure (!) 88/55, pulse 63, temperature 97.7  F (36.5  C), temperature source Skin, resp. rate 12, weight 52.3 kg (115 lb 4 oz), SpO2 99 %. Body mass index is 19.2 kg/m .  Patient Active Problem List   Diagnosis     History of shingles     History of loop electrical excision procedure (LEEP)       Wt Readings from Last 2 Encounters:   06/08/22 52.3 kg (115 lb 4 oz)   05/10/21 52.6 kg (116 lb)     BP Readings from Last 3 Encounters:   06/08/22 (!) 88/55   05/10/21 95/65   01/13/20 97/63         No current outpatient medications on file.     No current facility-administered medications for this visit.       Social History     Tobacco Use     Smoking status: Never Smoker     Smokeless tobacco: Never Used   Substance Use Topics     Alcohol use: Yes     Comment: GLASS OF WINE     Drug use: No       Health Maintenance Due   Topic Date Due     ADVANCE CARE PLANNING  Never done     HIV SCREENING  Never done     HEPATITIS C SCREENING  Never done     LIPID  Never done     MAMMO SCREENING  07/12/2020     PREVENTIVE CARE VISIT  01/13/2021     ZOSTER IMMUNIZATION (2 of 2) 11/09/2021       No results found for: PAP      June 8, 2022 12:52 PM

## 2022-09-11 ENCOUNTER — HEALTH MAINTENANCE LETTER (OUTPATIENT)
Age: 57
End: 2022-09-11

## 2022-11-13 ENCOUNTER — MYC MEDICAL ADVICE (OUTPATIENT)
Dept: FAMILY MEDICINE | Facility: CLINIC | Age: 57
End: 2022-11-13

## 2022-11-13 DIAGNOSIS — F41.8 PERFORMANCE ANXIETY: Primary | ICD-10-CM

## 2022-11-14 RX ORDER — PROPRANOLOL HYDROCHLORIDE 10 MG/1
TABLET ORAL
Qty: 20 TABLET | Refills: 0 | Status: SHIPPED | OUTPATIENT
Start: 2022-11-14

## 2022-11-14 NOTE — TELEPHONE ENCOUNTER
Pt requesting Propranolol for use during auditions.    Medication requested: propranolol (Inderal) 10 mg tablet  Last office visit: 6/8/2022  [facility] appointments: none  Medication last refilled: Historical  Last qualifying labs:   BP Readings from Last 3 Encounters:   06/08/22 (!) 88/55   05/10/21 95/65   01/13/20 97/63     Routing refill request to provider for review/approval because:  Medication is reported/historical    ZARIA Collado, RN  11/14/22, 8:38 AM

## 2023-04-30 ENCOUNTER — HEALTH MAINTENANCE LETTER (OUTPATIENT)
Age: 58
End: 2023-04-30

## 2023-08-28 ENCOUNTER — OFFICE VISIT (OUTPATIENT)
Dept: FAMILY MEDICINE | Facility: CLINIC | Age: 58
End: 2023-08-28
Payer: COMMERCIAL

## 2023-08-28 VITALS
TEMPERATURE: 98.1 F | DIASTOLIC BLOOD PRESSURE: 62 MMHG | SYSTOLIC BLOOD PRESSURE: 93 MMHG | OXYGEN SATURATION: 100 % | HEART RATE: 56 BPM

## 2023-08-28 DIAGNOSIS — F41.8 PERFORMANCE ANXIETY: Primary | ICD-10-CM

## 2023-08-28 RX ORDER — PROPRANOLOL HCL 60 MG
CAPSULE, EXTENDED RELEASE 24HR ORAL
Qty: 12 CAPSULE | Refills: 1 | Status: SHIPPED | OUTPATIENT
Start: 2023-08-28

## 2023-08-28 ASSESSMENT — PATIENT HEALTH QUESTIONNAIRE - PHQ9
SUM OF ALL RESPONSES TO PHQ QUESTIONS 1-9: 0
5. POOR APPETITE OR OVEREATING: NOT AT ALL

## 2023-08-28 ASSESSMENT — ANXIETY QUESTIONNAIRES
3. WORRYING TOO MUCH ABOUT DIFFERENT THINGS: NOT AT ALL
IF YOU CHECKED OFF ANY PROBLEMS ON THIS QUESTIONNAIRE, HOW DIFFICULT HAVE THESE PROBLEMS MADE IT FOR YOU TO DO YOUR WORK, TAKE CARE OF THINGS AT HOME, OR GET ALONG WITH OTHER PEOPLE: NOT DIFFICULT AT ALL
6. BECOMING EASILY ANNOYED OR IRRITABLE: NOT AT ALL
1. FEELING NERVOUS, ANXIOUS, OR ON EDGE: NOT AT ALL
GAD7 TOTAL SCORE: 0
2. NOT BEING ABLE TO STOP OR CONTROL WORRYING: NOT AT ALL
7. FEELING AFRAID AS IF SOMETHING AWFUL MIGHT HAPPEN: NOT AT ALL
GAD7 TOTAL SCORE: 0
5. BEING SO RESTLESS THAT IT IS HARD TO SIT STILL: NOT AT ALL

## 2023-08-28 NOTE — PROGRESS NOTES
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     Yusuf has a history of performance anxiety and has an Rx for propanolol 10 mg daily as needed 30 minutes prior to performance. However, she reports persistent palpitations and significant anxiety with a recent audition despite taking propanolol 10 mg once 60 minutes prior to the audition and again 30 minutes prior. She does note that this audition was with the evaluators behind a screen, which is more stressful for her than being able to see the evaluators. The propanolol was more helpful at a subsequent audition which had the evaluators visible, but she states that the propanolol has been much more helpful in the past. Additionally, her scheduled audition time is susceptible to change and it can be hard to time the propanolol. She also notes that the propanolol is not helpful with the mental aspect of her anxiety, only the physiological symptoms. She would like to discuss other treatment options today. She has never tried intentionally using deep breathing or mindfulness techniques.     MEDICATIONS:   Carefully Reviewed    REVIEW OF SYSTEMS:  10-point review of systems negative unless otherwise stated in the HPI.       OBJECTIVE:    EXAM:  GENERAL: She is in no distress.  Affect is upbeat.   Alert and oriented x3  BP 93/62 (BP Location: Right arm, Patient Position: Sitting, Cuff Size: Adult Regular)   Pulse 56   Temp 98.1  F (36.7  C) (Temporal)   SpO2 100%   HEENT:  Head is normocephalic, atraumatic.     LABORATORY DATA: None.       ASSESSMENT AND PLAN:   Performance anxiety: Start propanolol ER 60 mg daily as needed, only on days of auditions. This will allow for last-minute changes to audition times and help with the \"ramp up\" of nerves throughout the day of the audition. If necessary, may still take propanolol " short-acting 10 mg 30-60 minutes prior to audition. I will investigate options regarding psychotherapy and will get back to her with a name or two.       I, Sheila Garrison, am serving as a scribe to document services personally performed by Dr. Kam Malone, based on data collection and the provider's statements to me. Dr. Malone has reviewed, edited, and approved the above note.     I, Dr. Kam Malone, have interviewed and examined this patient, and I have thoroughly reviewed and edited this note and agree with its contents.

## 2023-08-28 NOTE — NURSING NOTE
58 year old    Chief Complaint   Patient presents with    Recheck Medication     Propanolol - has been ineffective lately, hoping to start a new med for the performance anxiety            Blood pressure 93/62, pulse 56, temperature 98.1  F (36.7  C), temperature source Temporal, SpO2 100 %. There is no height or weight on file to calculate BMI.    Patient Active Problem List   Diagnosis    History of shingles    History of loop electrical excision procedure (LEEP)              Wt Readings from Last 2 Encounters:   06/08/22 52.3 kg (115 lb 4 oz)   05/10/21 52.6 kg (116 lb)       BP Readings from Last 3 Encounters:   08/28/23 93/62   06/08/22 (!) 88/55   05/10/21 95/65                Current Outpatient Medications   Medication    propranolol (INDERAL) 10 MG tablet    mupirocin (BACTROBAN) 2 % external ointment     No current facility-administered medications for this visit.              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

## 2023-12-11 ENCOUNTER — LAB REQUISITION (OUTPATIENT)
Dept: LAB | Facility: CLINIC | Age: 58
End: 2023-12-11
Payer: COMMERCIAL

## 2023-12-11 DIAGNOSIS — L65.0 TELOGEN EFFLUVIUM: ICD-10-CM

## 2023-12-11 LAB
BASOPHILS # BLD AUTO: 0 10E3/UL (ref 0–0.2)
BASOPHILS NFR BLD AUTO: 0 %
EOSINOPHIL # BLD AUTO: 0.1 10E3/UL (ref 0–0.7)
EOSINOPHIL NFR BLD AUTO: 1 %
ERYTHROCYTE [DISTWIDTH] IN BLOOD BY AUTOMATED COUNT: 12 % (ref 10–15)
HCT VFR BLD AUTO: 40.9 % (ref 35–47)
HGB BLD-MCNC: 13.7 G/DL (ref 11.7–15.7)
IMM GRANULOCYTES # BLD: 0 10E3/UL
IMM GRANULOCYTES NFR BLD: 0 %
LYMPHOCYTES # BLD AUTO: 2 10E3/UL (ref 0.8–5.3)
LYMPHOCYTES NFR BLD AUTO: 24 %
MCH RBC QN AUTO: 31.4 PG (ref 26.5–33)
MCHC RBC AUTO-ENTMCNC: 33.5 G/DL (ref 31.5–36.5)
MCV RBC AUTO: 94 FL (ref 78–100)
MONOCYTES # BLD AUTO: 0.5 10E3/UL (ref 0–1.3)
MONOCYTES NFR BLD AUTO: 5 %
NEUTROPHILS # BLD AUTO: 5.8 10E3/UL (ref 1.6–8.3)
NEUTROPHILS NFR BLD AUTO: 70 %
NRBC # BLD AUTO: 0 10E3/UL
NRBC BLD AUTO-RTO: 0 /100
PLATELET # BLD AUTO: 225 10E3/UL (ref 150–450)
RBC # BLD AUTO: 4.37 10E6/UL (ref 3.8–5.2)
WBC # BLD AUTO: 8.4 10E3/UL (ref 4–11)

## 2023-12-11 PROCEDURE — 82306 VITAMIN D 25 HYDROXY: CPT | Mod: ORL | Performed by: DERMATOLOGY

## 2023-12-11 PROCEDURE — 85025 COMPLETE CBC W/AUTO DIFF WBC: CPT | Mod: ORL | Performed by: DERMATOLOGY

## 2023-12-11 PROCEDURE — 82728 ASSAY OF FERRITIN: CPT | Mod: ORL | Performed by: DERMATOLOGY

## 2023-12-12 LAB
FERRITIN SERPL-MCNC: 200 NG/ML (ref 11–328)
VIT D+METAB SERPL-MCNC: 16 NG/ML (ref 20–50)

## 2024-11-24 ENCOUNTER — HEALTH MAINTENANCE LETTER (OUTPATIENT)
Age: 59
End: 2024-11-24